# Patient Record
Sex: FEMALE | URBAN - METROPOLITAN AREA
[De-identification: names, ages, dates, MRNs, and addresses within clinical notes are randomized per-mention and may not be internally consistent; named-entity substitution may affect disease eponyms.]

---

## 2022-11-09 ENCOUNTER — APPOINTMENT (RX ONLY)
Dept: URBAN - METROPOLITAN AREA CLINIC 41 | Facility: CLINIC | Age: 67
Setting detail: DERMATOLOGY
End: 2022-11-09

## 2022-11-09 DIAGNOSIS — Z41.9 ENCOUNTER FOR PROCEDURE FOR PURPOSES OTHER THAN REMEDYING HEALTH STATE, UNSPECIFIED: ICD-10-CM

## 2022-11-09 PROCEDURE — ? COSMETIC CONSULTATION: FILLERS

## 2022-11-09 PROCEDURE — ? ADDITIONAL NOTES

## 2022-11-09 PROCEDURE — ? JUVEDERM VOLLURE XC INJECTION

## 2022-11-09 PROCEDURE — ? BOTOX

## 2022-11-09 PROCEDURE — ? COSMETIC CONSULTATION: BOTULINUM TOXIN

## 2022-11-09 ASSESSMENT — LOCATION SIMPLE DESCRIPTION DERM
LOCATION SIMPLE: LEFT FOREHEAD
LOCATION SIMPLE: LEFT CHEEK
LOCATION SIMPLE: RIGHT CHEEK
LOCATION SIMPLE: RIGHT EYEBROW
LOCATION SIMPLE: RIGHT FOREHEAD
LOCATION SIMPLE: LEFT EYEBROW
LOCATION SIMPLE: GLABELLA
LOCATION SIMPLE: INFERIOR FOREHEAD
LOCATION SIMPLE: RIGHT LIP

## 2022-11-09 ASSESSMENT — LOCATION DETAILED DESCRIPTION DERM
LOCATION DETAILED: RIGHT CENTRAL EYEBROW
LOCATION DETAILED: RIGHT FOREHEAD
LOCATION DETAILED: LEFT LATERAL MALAR CHEEK
LOCATION DETAILED: LEFT CENTRAL EYEBROW
LOCATION DETAILED: RIGHT LATERAL FOREHEAD
LOCATION DETAILED: LEFT MEDIAL EYEBROW
LOCATION DETAILED: INFERIOR MID FOREHEAD
LOCATION DETAILED: LEFT MEDIAL FOREHEAD
LOCATION DETAILED: RIGHT INFERIOR CENTRAL MALAR CHEEK
LOCATION DETAILED: LEFT INFERIOR MEDIAL MALAR CHEEK
LOCATION DETAILED: RIGHT MEDIAL FOREHEAD
LOCATION DETAILED: RIGHT UPPER CUTANEOUS LIP
LOCATION DETAILED: RIGHT MEDIAL EYEBROW
LOCATION DETAILED: RIGHT LATERAL MALAR CHEEK
LOCATION DETAILED: LEFT CENTRAL MALAR CHEEK
LOCATION DETAILED: LEFT SUPERIOR CENTRAL BUCCAL CHEEK
LOCATION DETAILED: LEFT FOREHEAD
LOCATION DETAILED: RIGHT CENTRAL MALAR CHEEK
LOCATION DETAILED: GLABELLA
LOCATION DETAILED: RIGHT SUPERIOR MEDIAL BUCCAL CHEEK

## 2022-11-09 ASSESSMENT — LOCATION ZONE DERM
LOCATION ZONE: LIP
LOCATION ZONE: FACE
LOCATION ZONE: FACE

## 2022-11-09 NOTE — PROCEDURE: ADDITIONAL NOTES
Additional Notes: Pt informs EM that she had botox done 3/2022. Pt informs EM that she’s mostly concerned about parentheses. EM recommends pt get Botox injected on forehead and glabella area. Pt would like to raise cheekbone. EM consults that filler would need to be added to add volume to create a lift in cheeks. EM consults with Pt that half a syringe would be needed per cheek. EM would like to start with lifting cheeks. EM consults Pt that the heavier the skin is, then the more filler is needed to lift the skin back up. EM consults with pt and offers for all 3 filler injections for cheeks and parentheses would be a total of $2500. Pt is considering getting fillers done, but would like effect to last. EM reassures pt that there are fillers available that last from 1 year to 2 years. Pt would like to try filler for a year. EM recommends Juvederm Vollure XC filler syringe. EM gives price estimate of $850 per syringe. \\n\\nPt would also like Botox. EM recommends 30 units, which gives total of $540.\\n\\n10 units for forehead \\n20 units for glabella
Detail Level: Detailed
Render Risk Assessment In Note?: no

## 2022-11-09 NOTE — HPI: COSMETIC CONSULTATION
Additional History: New pt seeks cosmetic consultation for forehead lines and parentheses. Pt would like to know EM’s recommendation and potentially talk about fillers. Pt is mainly concerned about parentheses lines. Pt had fillers done in 3/2022 for parentheses, forehead/glabella, and for eyebrow lift. Pt doesn’t know how many units, but she wants to consult.

## 2022-11-09 NOTE — PROCEDURE: JUVEDERM VOLLURE XC INJECTION
Price (Use Numbers Only, No Special Characters Or $): 0519 Price (Use Numbers Only, No Special Characters Or $): 9172

## 2022-11-09 NOTE — PROCEDURE: BOTOX
Price (Use Numbers Only, No Special Characters Or $): 357 Price (Use Numbers Only, No Special Characters Or $): 613

## 2022-12-01 ENCOUNTER — APPOINTMENT (RX ONLY)
Dept: URBAN - METROPOLITAN AREA CLINIC 41 | Facility: CLINIC | Age: 67
Setting detail: DERMATOLOGY
End: 2022-12-01

## 2022-12-01 DIAGNOSIS — Z41.9 ENCOUNTER FOR PROCEDURE FOR PURPOSES OTHER THAN REMEDYING HEALTH STATE, UNSPECIFIED: ICD-10-CM

## 2022-12-01 PROCEDURE — ? ADDITIONAL NOTES

## 2022-12-01 PROCEDURE — ? FILLERS

## 2022-12-01 PROCEDURE — ? COSMETIC CONSULTATION: IPL

## 2022-12-01 PROCEDURE — ? MEDICAL CONSULTATION: VOLUDERM

## 2022-12-01 PROCEDURE — ? COSMETIC CONSULTATION: FILLERS

## 2022-12-01 ASSESSMENT — LOCATION DETAILED DESCRIPTION DERM
LOCATION DETAILED: LEFT LATERAL BUCCAL CHEEK
LOCATION DETAILED: RIGHT MEDIAL BUCCAL CHEEK
LOCATION DETAILED: LEFT INFERIOR LATERAL BUCCAL CHEEK
LOCATION DETAILED: RIGHT INFERIOR CENTRAL BUCCAL CHEEK
LOCATION DETAILED: LEFT MEDIAL BUCCAL CHEEK

## 2022-12-01 ASSESSMENT — LOCATION SIMPLE DESCRIPTION DERM
LOCATION SIMPLE: LEFT CHEEK
LOCATION SIMPLE: RIGHT CHEEK

## 2022-12-01 ASSESSMENT — LOCATION ZONE DERM: LOCATION ZONE: FACE

## 2022-12-01 NOTE — PROCEDURE: FILLERS
Price (Use Numbers Only, No Special Characters Or $): 274 Price (Use Numbers Only, No Special Characters Or $): 538

## 2022-12-01 NOTE — PROCEDURE: ADDITIONAL NOTES
Additional Notes: Gabriela notes we could put more  the cheeks today but the patient looks very natural today. Patient notes she is currently happy. Gabriela requests the patient to return to office in three months for further touch ups. Gabriela notes depending on the patient’s metabolism, she may not break down the Botox as much.
Detail Level: Detailed
Render Risk Assessment In Note?: no
Additional Notes: Patient consent was obtained to proceed with the visit and recommended plan of care after discussion of all risks and benefits, including the risks of COVID-19 exposure.
Detail Level: Simple

## 2024-11-13 ENCOUNTER — APPOINTMENT (RX ONLY)
Dept: URBAN - METROPOLITAN AREA CLINIC 41 | Facility: CLINIC | Age: 69
Setting detail: DERMATOLOGY
End: 2024-11-13

## 2024-11-13 DIAGNOSIS — Z41.9 ENCOUNTER FOR PROCEDURE FOR PURPOSES OTHER THAN REMEDYING HEALTH STATE, UNSPECIFIED: ICD-10-CM

## 2024-11-13 PROCEDURE — ? BOTOX

## 2024-11-13 PROCEDURE — ? ADDITIONAL NOTES

## 2024-11-13 PROCEDURE — ? JUVEDERM VOLLURE XC INJECTION

## 2024-11-13 ASSESSMENT — LOCATION DETAILED DESCRIPTION DERM
LOCATION DETAILED: RIGHT SUPERIOR MEDIAL BUCCAL CHEEK
LOCATION DETAILED: RIGHT CENTRAL EYEBROW
LOCATION DETAILED: RIGHT LATERAL MALAR CHEEK
LOCATION DETAILED: RIGHT INFERIOR CENTRAL MALAR CHEEK
LOCATION DETAILED: LEFT CENTRAL EYEBROW
LOCATION DETAILED: INFERIOR MID FOREHEAD
LOCATION DETAILED: RIGHT LATERAL FOREHEAD
LOCATION DETAILED: LEFT LATERAL MALAR CHEEK
LOCATION DETAILED: LEFT FOREHEAD
LOCATION DETAILED: RIGHT UPPER CUTANEOUS LIP
LOCATION DETAILED: RIGHT CENTRAL MALAR CHEEK
LOCATION DETAILED: GLABELLA
LOCATION DETAILED: RIGHT MEDIAL EYEBROW
LOCATION DETAILED: RIGHT MEDIAL FOREHEAD
LOCATION DETAILED: LEFT CENTRAL MALAR CHEEK
LOCATION DETAILED: RIGHT FOREHEAD
LOCATION DETAILED: LEFT SUPERIOR CENTRAL BUCCAL CHEEK
LOCATION DETAILED: LEFT MEDIAL EYEBROW
LOCATION DETAILED: LEFT MEDIAL FOREHEAD
LOCATION DETAILED: LEFT INFERIOR MEDIAL MALAR CHEEK

## 2024-11-13 ASSESSMENT — LOCATION SIMPLE DESCRIPTION DERM
LOCATION SIMPLE: RIGHT CHEEK
LOCATION SIMPLE: INFERIOR FOREHEAD
LOCATION SIMPLE: LEFT FOREHEAD
LOCATION SIMPLE: LEFT CHEEK
LOCATION SIMPLE: RIGHT FOREHEAD
LOCATION SIMPLE: GLABELLA
LOCATION SIMPLE: RIGHT EYEBROW
LOCATION SIMPLE: RIGHT LIP
LOCATION SIMPLE: LEFT EYEBROW

## 2024-11-13 ASSESSMENT — LOCATION ZONE DERM
LOCATION ZONE: LIP
LOCATION ZONE: FACE
LOCATION ZONE: FACE

## 2024-11-13 NOTE — PROCEDURE: BOTOX
[facility] Units: 0
Show Mentalis Units: No
Show Forehead Units: Yes
Consent: Written consent obtained. Risks include but not limited to lid/brow ptosis, bruising, swelling, diplopia, temporary effect, incomplete chemical denervation.
Lot #: [id number]
Forehead Units: 15
Glabellar Complex Units: 20
Dilution (U/0.1 Cc): 1.1
Detail Level: Zone
Post-Care Instructions: Patient instructed to not lie down for 4 hours and limit physical activity for 24 hours. Patient instructed not to travel by airplane for 48 hours.
Expiration Date (Month Year): 09/2026
Price (Use Numbers Only, No Special Characters Or $): 418
Incrementing Botox Units: By 0.5 Units

## 2024-11-13 NOTE — PROCEDURE: JUVEDERM VOLLURE XC INJECTION
Additional Area 1 Volume In Cc: 0
Cheeks Filler Volume In Cc: 1
Lot #: [id number]
Map Statment: See Attach Map for Complete Details
Include Cannula Information In Note?: No
Post-Care Instructions: Patient instructed to apply ice to reduce swelling.
Filler: Juvederm Vollure XC
Price (Use Numbers Only, No Special Characters Or $): 8426
Expiration Date (Month Year): 2025-12-18
Anesthesia Type: 1% lidocaine with epinephrine
Additional Anesthesia Volume In Cc: 6
Number Of Syringes (Required For Inventory): 2
Detail Level: Detailed
Anesthesia Volume In Cc: 0.5
Consent: Written consent obtained. Risks include but not limited to bruising, beading, irregular texture, ulceration, infection, allergic reaction, scar formation, incomplete augmentation, temporary nature, procedural pain.
Procedural Text: The filler was administered to the treatment areas noted above.

## 2024-11-13 NOTE — PROCEDURE: ADDITIONAL NOTES
Additional Notes: -\\n\\nCounsels pt on adding volume into cheeks\\nPt increased dose of Botox to 15 units in forehead.\\nPt received 10% discount for November special on Botox original price for 35 units was $630.\\n\\n18 month syringe Juvederm voluma $1000 per syringe, for cheeks \\n\\nCounsels pt that there is no skin tightening for the neck, kybella is injection that we offer in office to remove the fat so the skin appears flat after injection. \\n\\nCounsels pt on laser tightening, neck lift for surgery.
Render Risk Assessment In Note?: no
Detail Level: Detailed

## 2024-11-27 ENCOUNTER — APPOINTMENT (RX ONLY)
Dept: URBAN - METROPOLITAN AREA CLINIC 41 | Facility: CLINIC | Age: 69
Setting detail: DERMATOLOGY
End: 2024-11-27

## 2024-11-27 DIAGNOSIS — Z41.9 ENCOUNTER FOR PROCEDURE FOR PURPOSES OTHER THAN REMEDYING HEALTH STATE, UNSPECIFIED: ICD-10-CM

## 2024-11-27 PROCEDURE — ? JUVEDERM VOLLURE XC INJECTION

## 2024-11-27 PROCEDURE — ? COSMETIC CONSULTATION: FILLERS

## 2024-11-27 PROCEDURE — ? JUVEDERM ULTRA XC INJECTION

## 2024-11-27 PROCEDURE — ? ADDITIONAL NOTES

## 2024-11-27 ASSESSMENT — LOCATION SIMPLE DESCRIPTION DERM
LOCATION SIMPLE: RIGHT LIP
LOCATION SIMPLE: RIGHT CHEEK
LOCATION SIMPLE: LEFT CHEEK

## 2024-11-27 ASSESSMENT — LOCATION DETAILED DESCRIPTION DERM
LOCATION DETAILED: RIGHT MEDIAL BUCCAL CHEEK
LOCATION DETAILED: LEFT CENTRAL BUCCAL CHEEK
LOCATION DETAILED: LEFT INFERIOR MEDIAL MALAR CHEEK
LOCATION DETAILED: RIGHT UPPER CUTANEOUS LIP
LOCATION DETAILED: RIGHT NASOLABIAL FOLD
LOCATION DETAILED: LEFT MEDIAL BUCCAL CHEEK
LOCATION DETAILED: LEFT SUPERIOR MEDIAL BUCCAL CHEEK
LOCATION DETAILED: RIGHT INFERIOR CENTRAL BUCCAL CHEEK
LOCATION DETAILED: LEFT LATERAL BUCCAL CHEEK
LOCATION DETAILED: RIGHT SUPERIOR MEDIAL BUCCAL CHEEK

## 2024-11-27 ASSESSMENT — LOCATION ZONE DERM
LOCATION ZONE: LIP
LOCATION ZONE: FACE

## 2024-11-27 NOTE — PROCEDURE: JUVEDERM VOLLURE XC INJECTION
Lateral Face Filler Volume In Cc: 0
Anesthesia Type: 1% lidocaine with epinephrine
Use Map Statement For Sites (Optional): No
Additional Anesthesia Volume In Cc: 6
Procedural Text: The filler was administered to the treatment areas noted above.
Number Of Syringes (Required For Inventory): 1
Consent: Written consent obtained. Risks include but not limited to bruising, beading, irregular texture, ulceration, infection, allergic reaction, scar formation, incomplete augmentation, temporary nature, procedural pain.
Lot #: [id number]
Anesthesia Volume In Cc: 0.5
Post-Care Instructions: Patient instructed to apply ice to reduce swelling.
Detail Level: Detailed
Map Statment: See Attach Map for Complete Details
Expiration Date (Month Year): 12/18/2025
Filler: Juvederm Vollure XC
Price (Use Numbers Only, No Special Characters Or $): 922

## 2024-11-27 NOTE — PROCEDURE: JUVEDERM ULTRA XC INJECTION
Filler: Juvederm Ultra XC
Expiration Date (Month Year): 01/03/2026
Mid Face Filler Volume In Cc: 0
Post-Care Instructions: Patient instructed to apply ice to reduce swelling.
Price (Use Numbers Only, No Special Characters Or $): 699
Include Cannula Information In Note?: No
Anesthesia Type: 1% lidocaine with epinephrine
Additional Anesthesia Volume In Cc: 6
Number Of Syringes (Required For Inventory): 1
Lot #: [id number]
Detail Level: Detailed
Anesthesia Volume In Cc: 0.5
Consent: Written consent obtained. Risks include but not limited to bruising, beading, irregular texture, ulceration, infection, allergic reaction, scar formation, incomplete augmentation, temporary nature, procedural pain.
Map Statment: See Attach Map for Complete Details
Procedural Text: The filler was administered to the treatment areas noted above.

## 2024-11-27 NOTE — PROCEDURE: ADDITIONAL NOTES
Additional Notes: 1 juvederm ultra XC syringe for $750 and 1 juvederm vollure syringe for $850. Provided pt with samples of eye max cream. Honoring 10% off for tx today so $1440 total.
Render Risk Assessment In Note?: no
Detail Level: Detailed
Additional Notes: Filler for ZABRINA: $1000 RevoLaze XC